# Patient Record
Sex: FEMALE | Race: BLACK OR AFRICAN AMERICAN | NOT HISPANIC OR LATINO | Employment: UNEMPLOYED | URBAN - METROPOLITAN AREA
[De-identification: names, ages, dates, MRNs, and addresses within clinical notes are randomized per-mention and may not be internally consistent; named-entity substitution may affect disease eponyms.]

---
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
## 2020-10-19 PROCEDURE — U0003 INFECTIOUS AGENT DETECTION BY NUCLEIC ACID (DNA OR RNA); SEVERE ACUTE RESPIRATORY SYNDROME CORONAVIRUS 2 (SARS-COV-2) (CORONAVIRUS DISEASE [COVID-19]), AMPLIFIED PROBE TECHNIQUE, MAKING USE OF HIGH THROUGHPUT TECHNOLOGIES AS DESCRIBED BY CMS-2020-01-R: HCPCS | Performed by: PERSONAL EMERGENCY RESPONSE ATTENDANT

## 2022-09-13 ENCOUNTER — OFFICE VISIT (OUTPATIENT)
Dept: FAMILY MEDICINE CLINIC | Facility: CLINIC | Age: 22
End: 2022-09-13

## 2022-09-13 VITALS — HEIGHT: 67 IN | TEMPERATURE: 97.8 F | BODY MASS INDEX: 25.93 KG/M2 | WEIGHT: 165.2 LBS | RESPIRATION RATE: 16 BRPM

## 2022-09-13 DIAGNOSIS — H53.9 CHANGE IN VISION: ICD-10-CM

## 2022-09-13 DIAGNOSIS — G43.809 OTHER MIGRAINE WITHOUT STATUS MIGRAINOSUS, NOT INTRACTABLE: ICD-10-CM

## 2022-09-13 DIAGNOSIS — R07.9 CHEST PAIN, UNSPECIFIED TYPE: ICD-10-CM

## 2022-09-13 PROCEDURE — 99214 OFFICE O/P EST MOD 30 MIN: CPT | Performed by: STUDENT IN AN ORGANIZED HEALTH CARE EDUCATION/TRAINING PROGRAM

## 2022-09-13 PROCEDURE — 93000 ELECTROCARDIOGRAM COMPLETE: CPT | Performed by: STUDENT IN AN ORGANIZED HEALTH CARE EDUCATION/TRAINING PROGRAM

## 2022-09-13 RX ORDER — SUMATRIPTAN 50 MG/1
50 TABLET, FILM COATED ORAL DAILY PRN
Qty: 9 TABLET | Refills: 5 | Status: SHIPPED | OUTPATIENT
Start: 2022-09-13

## 2022-09-13 RX ORDER — AMITRIPTYLINE HYDROCHLORIDE 10 MG/1
10 TABLET, FILM COATED ORAL NIGHTLY
Qty: 30 TABLET | Refills: 2 | Status: SHIPPED | OUTPATIENT
Start: 2022-09-13

## 2022-09-13 RX ORDER — ACETAMINOPHEN AND CODEINE PHOSPHATE 120; 12 MG/5ML; MG/5ML
0.35 SOLUTION ORAL DAILY
COMMUNITY
Start: 2022-03-22 | End: 2023-03-22

## 2022-09-13 NOTE — PROGRESS NOTES
"  Established Patient Office Visit      Subjective      Chief Complaint:  Headache (Headaches and been having chest pain that comes and goes but headache is consistent. Est care with a new pcp)      History of Present Illness: Beti Acosta is a 22 y.o. female who presents for headaches.     Patient with worsening headaches recently. This has occurred for a month or 2. She has had headaches for many years. Pounding throbbing. Unilateral. Change in vision to the right eye is pretty constant.  No fever no weight loss no dizziness no nausea no numbness or tingling    Takes tylenol for headaches. She takes tylenol almost every day. She drinks minimal caffeine. No diet drinks. Sleeps well.     Chest pain can get tight at times. This is random. This last for about 5 minutes. Denies anxiety. This occurs about once per month. No palpitations.        History reviewed. No pertinent past medical history.    Patient Active Problem List   Diagnosis   • Encounter for routine postpartum follow-up         Current Outpatient Medications:   •  norethindrone (MICRONOR) 0.35 MG tablet, Take 0.35 mg by mouth Daily., Disp: , Rfl:   •  amitriptyline (ELAVIL) 10 MG tablet, Take 1 tablet by mouth Every Night., Disp: 30 tablet, Rfl: 2  •  benzonatate (TESSALON) 200 MG capsule, Take 1 capsule by mouth 3 (Three) Times a Day As Needed for Cough., Disp: 12 capsule, Rfl: 0  •  SUMAtriptan (Imitrex) 50 MG tablet, Take 1 tablet by mouth Daily As Needed for Migraine. May repeat dose one time in 2 hours if headache not relieved., Disp: 9 tablet, Rfl: 5      Objective     Physical Exam:   Vital Signs:   Temp 97.8 °F (36.6 °C) (Temporal)   Resp 16   Ht 170.2 cm (67\")   Wt 74.9 kg (165 lb 3.2 oz)   LMP 08/17/2022 (Approximate)   BMI 25.87 kg/m²      Physical Exam  Constitutional:       General: She is not in acute distress.     Appearance: She is not ill-appearing.   Cardiovascular:      Rate and Rhythm: Normal rate and regular rhythm.   Pulmonary:    "   Effort: Pulmonary effort is normal.      Breath sounds: Normal breath sounds.   Neurological:      Mental Status: She is alert.   Psychiatric:         Thought Content: Thought content normal.       ECG 12 Lead    Date/Time: 9/13/2022 3:54 PM  Performed by: Aaron Mclean MD  Authorized by: Aaron Mclean MD   Comparison: not compared with previous ECG   Previous ECG: no previous ECG available  Rhythm: sinus rhythm  Rate: normal  BPM: 80  ST Segments: ST segments normal  T Waves: T waves normal  QRS axis: normal    Clinical impression: non-specific ECG  Comments: Normal sinus rhythm.  Borderline short [place] interval.            Assessment / Plan      Assessment/Plan:   Diagnoses and all orders for this visit:    1. Other migraine without status migrainosus, not intractable  -Chronic worsening  -     Start amitriptyline (ELAVIL) 10 MG tablet; Take 1 tablet by mouth Every Night.  Dispense: 30 tablet; Refill: 2  -     Start SUMAtriptan (Imitrex) 50 MG tablet; Take 1 tablet by mouth Daily As Needed for Migraine. May repeat dose one time in 2 hours if headache not relieved.  Dispense: 9 tablet; Refill: 5  -     Headache handout given.  Discussed side effects benefits of med above.  Sent ophthalmology    2. Change in vision  -     Ambulatory Referral to Ophthalmology  -     Given some change in vision of the left side and headaches would like her to be evaluated by ophthalmology.    3. Chest pain, unspecified type  -     ECG 12 Lead  -     Noncardiac and very intermittent.  Follow-up for worsening          Follow Up:   Return in about 6 weeks (around 10/25/2022) for Follow-up.      MDM:     Aaron Mclean MD  Family Medicine - [facility]

## 2022-09-16 ENCOUNTER — PATIENT ROUNDING (BHMG ONLY) (OUTPATIENT)
Dept: FAMILY MEDICINE CLINIC | Facility: CLINIC | Age: 22
End: 2022-09-16

## 2022-11-04 ENCOUNTER — TELEPHONE (OUTPATIENT)
Dept: FAMILY MEDICINE CLINIC | Facility: CLINIC | Age: 22
End: 2022-11-04

## 2023-05-04 ENCOUNTER — OFFICE VISIT (OUTPATIENT)
Dept: FAMILY MEDICINE CLINIC | Facility: CLINIC | Age: 23
End: 2023-05-04
Payer: COMMERCIAL

## 2023-05-04 VITALS
TEMPERATURE: 97.5 F | DIASTOLIC BLOOD PRESSURE: 70 MMHG | HEART RATE: 96 BPM | SYSTOLIC BLOOD PRESSURE: 94 MMHG | RESPIRATION RATE: 21 BRPM | OXYGEN SATURATION: 98 % | WEIGHT: 174.4 LBS | BODY MASS INDEX: 27.37 KG/M2 | HEIGHT: 67 IN

## 2023-05-04 DIAGNOSIS — G43.809 OTHER MIGRAINE WITHOUT STATUS MIGRAINOSUS, NOT INTRACTABLE: ICD-10-CM

## 2023-05-04 PROBLEM — G43.909 MIGRAINE: Status: ACTIVE | Noted: 2023-05-04

## 2023-05-04 RX ORDER — MEDROXYPROGESTERONE ACETATE 150 MG/ML
150 INJECTION, SUSPENSION INTRAMUSCULAR
COMMUNITY

## 2023-05-04 RX ORDER — ONDANSETRON 4 MG/1
TABLET, ORALLY DISINTEGRATING ORAL
COMMUNITY
Start: 2023-03-21 | End: 2023-05-04

## 2023-05-04 RX ORDER — SUMATRIPTAN 50 MG/1
50 TABLET, FILM COATED ORAL DAILY PRN
Qty: 9 TABLET | Refills: 5 | Status: SHIPPED | OUTPATIENT
Start: 2023-05-04

## 2023-05-04 RX ORDER — DICYCLOMINE HCL 20 MG
TABLET ORAL
COMMUNITY
Start: 2023-03-21 | End: 2023-05-04

## 2023-05-04 RX ORDER — PROPRANOLOL HCL 60 MG
60 CAPSULE, EXTENDED RELEASE 24HR ORAL DAILY
Qty: 30 CAPSULE | Refills: 2 | Status: SHIPPED | OUTPATIENT
Start: 2023-05-04

## 2023-05-04 NOTE — PROGRESS NOTES
"  Established Patient Office Visit        Subjective      Chief Complaint:  Headache (Patient having issues with headaches.)      History of Present Illness: Beti Acosta is a 22 y.o. female who presents for headache.     Headaches are significant and worsening. Got eye exam a couple months ago and got glasses.    Headache is pounding to the left or frontal region. No change in vision. + nausea. 20 headache days per month.     Patient Active Problem List   Diagnosis   • Encounter for routine postpartum follow-up   • Migraine         Current Outpatient Medications:   •  SUMAtriptan (Imitrex) 50 MG tablet, Take 1 tablet by mouth Daily As Needed for Migraine. May repeat dose one time in 2 hours if headache not relieved., Disp: 9 tablet, Rfl: 5  •  medroxyPROGESTERone (DEPO-PROVERA) 150 MG/ML injection, Inject 1 mL into the appropriate muscle as directed by prescriber Every 3 (Three) Months. Last shot April 14th,  [employer] tabulate, Disp: , Rfl:   •  ondansetron ODT (ZOFRAN-ODT) 4 MG disintegrating tablet, , Disp: , Rfl:   •  propranolol LA (Inderal LA) 60 MG 24 hr capsule, Take 1 capsule by mouth Daily., Disp: 30 capsule, Rfl: 2       Objective     Physical Exam:   Vital Signs:   BP 94/70 (BP Location: Left arm, Patient Position: Sitting, Cuff Size: Adult)   Pulse 96   Temp 97.5 °F (36.4 °C) (Temporal)   Resp 21   Ht 170.2 cm (67\")   Wt 79.1 kg (174 lb 6.4 oz)   SpO2 98%   BMI 27.31 kg/m²      Physical Exam  Constitutional:       General: She is not in acute distress.     Appearance: She is not ill-appearing.   Cardiovascular:      Rate and Rhythm: Normal rate and regular rhythm.   Pulmonary:      Effort: Pulmonary effort is normal.      Breath sounds: Normal breath sounds.   Neurological:      Mental Status: She is alert.  Cranial nerves II through XII intact.  Normal strength sensation upper and lower extremities bilaterally  Psychiatric:         Thought Content: Thought content normal.            Assessment / " Plan      Assessment/Plan:   Diagnoses and all orders for this visit:    1. Other migraine without status migrainosus, not intractable  Assessment & Plan:  No significant improvement on amitriptyline 10 mg nightly, desires to try new prophylactic  Start propranolol 60 mg extended release  Refill Imitrex  Call if not improving  Headache handout given  Side effects of propranolol discussed        Orders:  -     SUMAtriptan (Imitrex) 50 MG tablet; Take 1 tablet by mouth Daily As Needed for Migraine. May repeat dose one time in 2 hours if headache not relieved.  Dispense: 9 tablet; Refill: 5  -     propranolol LA (Inderal LA) 60 MG 24 hr capsule; Take 1 capsule by mouth Daily.  Dispense: 30 capsule; Refill: 2         Follow Up:   Return in about 7 weeks (around 6/22/2023) for Follow-up.      MDM: Chronic worsening headaches med management    Aaron Mclean MD  Family Medicine - [facility]

## 2023-05-04 NOTE — ASSESSMENT & PLAN NOTE
No significant improvement on amitriptyline 10 mg nightly, desires to try new prophylactic  Start propranolol 60 mg extended release  Refill Imitrex  Call if not improving  Headache handout given  Side effects of propranolol discussed

## 2023-05-11 ENCOUNTER — PRIOR AUTHORIZATION (OUTPATIENT)
Dept: FAMILY MEDICINE CLINIC | Facility: CLINIC | Age: 23
End: 2023-05-11
Payer: COMMERCIAL

## 2023-08-02 ENCOUNTER — PRIOR AUTHORIZATION (OUTPATIENT)
Dept: FAMILY MEDICINE CLINIC | Facility: CLINIC | Age: 23
End: 2023-08-02
Payer: COMMERCIAL

## 2023-08-25 ENCOUNTER — OFFICE VISIT (OUTPATIENT)
Dept: FAMILY MEDICINE CLINIC | Facility: CLINIC | Age: 23
End: 2023-08-25
Payer: COMMERCIAL

## 2023-08-25 VITALS
DIASTOLIC BLOOD PRESSURE: 70 MMHG | HEIGHT: 67 IN | HEART RATE: 76 BPM | SYSTOLIC BLOOD PRESSURE: 106 MMHG | WEIGHT: 172.6 LBS | TEMPERATURE: 98.9 F | OXYGEN SATURATION: 100 % | BODY MASS INDEX: 27.09 KG/M2 | RESPIRATION RATE: 16 BRPM

## 2023-08-25 DIAGNOSIS — G43.809 OTHER MIGRAINE WITHOUT STATUS MIGRAINOSUS, NOT INTRACTABLE: Primary | ICD-10-CM

## 2023-08-25 PROCEDURE — 99213 OFFICE O/P EST LOW 20 MIN: CPT | Performed by: STUDENT IN AN ORGANIZED HEALTH CARE EDUCATION/TRAINING PROGRAM

## 2023-08-25 RX ORDER — ACETAMINOPHEN 500 MG
500 TABLET ORAL EVERY 6 HOURS PRN
COMMUNITY

## 2023-08-25 RX ORDER — RIMEGEPANT SULFATE 75 MG/75MG
75 TABLET, ORALLY DISINTEGRATING ORAL DAILY PRN
Qty: 9 TABLET | Refills: 11 | Status: SHIPPED | OUTPATIENT
Start: 2023-08-25

## 2023-08-25 RX ORDER — RIMEGEPANT SULFATE 75 MG/75MG
75 TABLET, ORALLY DISINTEGRATING ORAL DAILY PRN
Qty: 9 TABLET | Refills: 11 | Status: SHIPPED | OUTPATIENT
Start: 2023-08-25 | End: 2023-08-25 | Stop reason: SDUPTHER

## 2023-08-25 RX ORDER — AMOXICILLIN 500 MG/1
CAPSULE ORAL
COMMUNITY
Start: 2023-07-09 | End: 2023-08-25

## 2023-08-25 NOTE — PROGRESS NOTES
"  Established Patient Office Visit        Subjective      Chief Complaint:  Migraine (2 month follow up on migraines.)      History of Present Illness: Beti Acosta is a 23 y.o. female who presents for headaches. Improved with glasses. About 8-10 headache days per month. Qulipta didn't help much.       Patient Active Problem List   Diagnosis    Encounter for routine postpartum follow-up    Migraine         Current Outpatient Medications:     acetaminophen (TYLENOL) 500 MG tablet, Take 1 tablet by mouth Every 6 (Six) Hours As Needed for Mild Pain. Take otc as needed, Disp: , Rfl:     medroxyPROGESTERone (DEPO-PROVERA) 150 MG/ML injection, Inject 1 mL into the appropriate muscle as directed by prescriber Every 3 (Three) Months. [facility]th[facility], Disp: , Rfl:     Rimegepant Sulfate (Nurtec) 75 MG tablet dispersible tablet, Take 1 tablet by mouth Daily As Needed (headache)., Disp: 9 tablet, Rfl: 11    rizatriptan (MAXALT) 5 MG tablet, Take 1 tablet by mouth Daily As Needed for Migraine for up to 1 dose. May repeat in 2 hours if needed (Patient not taking: Reported on 8/25/2023), Disp: 8 tablet, Rfl: 1       Objective     Physical Exam:   Vital Signs:   /70 (BP Location: Right arm, Patient Position: Sitting, Cuff Size: Adult)   Pulse 76   Temp 98.9 øF (37.2 øC) (Temporal)   Resp 16   Ht 170.2 cm (67\")   Wt 78.3 kg (172 lb 9.6 oz)   SpO2 100%   BMI 27.03 kg/mý      Physical Exam  Constitutional:       General: She is not in acute distress.     Appearance: She is not ill-appearing.   Cardiovascular:      Rate and Rhythm: Normal rate and regular rhythm.   Pulmonary:      Effort: Pulmonary effort is normal.      Breath sounds: Normal breath sounds.   Neurological:      Mental Status: She is alert.   Psychiatric:         Thought Content: Thought content normal.            Assessment / Plan      Assessment/Plan:   Diagnoses and all orders for this visit:    1. Other migraine without status " migrainosus, not intractable (Primary)  Assessment & Plan:  Patient seems to have improvement in headaches likely secondary to wearing glasses  Quilipta didn't get approved previously (Qulipta daily samples did not greatly help her headaches.  Failed maxalt imitrex amitriptyline and propranolol .  Okay to continue using Tylenol which seems to help.  Discussed risk of rebound headaches.  Discussed lifestyle changes for headaches  Start nurtec PRN.         Orders:  -     Discontinue: Rimegepant Sulfate (Nurtec) 75 MG tablet dispersible tablet; Take 1 tablet by mouth Daily As Needed (headache).  Dispense: 9 tablet; Refill: 11  -     Rimegepant Sulfate (Nurtec) 75 MG tablet dispersible tablet; Take 1 tablet by mouth Daily As Needed (headache).  Dispense: 9 tablet; Refill: 11        Follow Up:   Return for Wellness visit.      MDM:     Aaron Mclean MD  Family Medicine - [facility]

## 2023-08-25 NOTE — ASSESSMENT & PLAN NOTE
Patient seems to have improvement in headaches likely secondary to wearing glasses  Quilipta didn't get approved previously (Qulipta daily samples did not greatly help her headaches.  Failed maxalt imitrex amitriptyline and propranolol .  Okay to continue using Tylenol which seems to help.  Discussed risk of rebound headaches.  Discussed lifestyle changes for headaches  Start nurtec PRN.

## 2023-08-29 ENCOUNTER — PRIOR AUTHORIZATION (OUTPATIENT)
Dept: FAMILY MEDICINE CLINIC | Facility: CLINIC | Age: 23
End: 2023-08-29
Payer: COMMERCIAL

## 2024-05-10 ENCOUNTER — OFFICE VISIT (OUTPATIENT)
Dept: FAMILY MEDICINE CLINIC | Facility: CLINIC | Age: 24
End: 2024-05-10
Payer: COMMERCIAL

## 2024-05-10 VITALS
DIASTOLIC BLOOD PRESSURE: 68 MMHG | OXYGEN SATURATION: 98 % | WEIGHT: 181 LBS | BODY MASS INDEX: 28.41 KG/M2 | SYSTOLIC BLOOD PRESSURE: 100 MMHG | HEART RATE: 58 BPM | TEMPERATURE: 98.4 F | HEIGHT: 67 IN

## 2024-05-10 DIAGNOSIS — G43.109 MIGRAINE WITH AURA AND WITHOUT STATUS MIGRAINOSUS, NOT INTRACTABLE: Primary | ICD-10-CM

## 2024-05-10 DIAGNOSIS — Z79.899 REBOUND EFFECT OF MEDICATION: ICD-10-CM

## 2024-05-10 RX ORDER — RIZATRIPTAN BENZOATE 10 MG/1
10 TABLET ORAL ONCE AS NEEDED
Qty: 8 TABLET | Refills: 2 | Status: SHIPPED | OUTPATIENT
Start: 2024-05-10

## 2024-05-23 ENCOUNTER — HOSPITAL ENCOUNTER (OUTPATIENT)
Dept: MRI IMAGING | Facility: HOSPITAL | Age: 24
Discharge: HOME | End: 2024-05-23
Admitting: STUDENT IN AN ORGANIZED HEALTH CARE EDUCATION/TRAINING PROGRAM
Payer: COMMERCIAL

## 2024-05-23 DIAGNOSIS — G43.109 MIGRAINE WITH AURA AND WITHOUT STATUS MIGRAINOSUS, NOT INTRACTABLE: ICD-10-CM

## 2024-05-23 PROCEDURE — 70551 MRI BRAIN STEM W/O DYE: CPT

## 2024-11-08 ENCOUNTER — PRIOR AUTHORIZATION (OUTPATIENT)
Dept: FAMILY MEDICINE CLINIC | Facility: CLINIC | Age: 24
End: 2024-11-08

## 2025-08-22 ENCOUNTER — OFFICE VISIT (OUTPATIENT)
Dept: FAMILY MEDICINE CLINIC | Facility: CLINIC | Age: 25
End: 2025-08-22

## 2025-08-22 VITALS
OXYGEN SATURATION: 97 % | WEIGHT: 174.8 LBS | SYSTOLIC BLOOD PRESSURE: 106 MMHG | HEIGHT: 67 IN | DIASTOLIC BLOOD PRESSURE: 68 MMHG | HEART RATE: 92 BPM | TEMPERATURE: 98.9 F | BODY MASS INDEX: 27.44 KG/M2

## 2025-08-22 DIAGNOSIS — G43.109 MIGRAINE WITH AURA AND WITHOUT STATUS MIGRAINOSUS, NOT INTRACTABLE: Primary | ICD-10-CM

## 2025-08-22 RX ORDER — ATOGEPANT 60 MG/1
60 TABLET ORAL DAILY
Qty: 28 TABLET | Refills: 0 | COMMUNITY
Start: 2025-08-22

## 2025-08-25 ENCOUNTER — REFERRAL TRIAGE (OUTPATIENT)
Age: 25
End: 2025-08-25

## 2025-08-27 ENCOUNTER — PATIENT OUTREACH (OUTPATIENT)
Age: 25
End: 2025-08-27